# Patient Record
Sex: FEMALE | Race: BLACK OR AFRICAN AMERICAN | NOT HISPANIC OR LATINO | Employment: UNEMPLOYED | URBAN - METROPOLITAN AREA
[De-identification: names, ages, dates, MRNs, and addresses within clinical notes are randomized per-mention and may not be internally consistent; named-entity substitution may affect disease eponyms.]

---

## 2024-04-03 ENCOUNTER — TELEPHONE (OUTPATIENT)
Dept: GASTROENTEROLOGY | Facility: CLINIC | Age: 54
End: 2024-04-03
Payer: MEDICAID

## 2024-04-03 NOTE — TELEPHONE ENCOUNTER
Referral received from Dr. David Hager. Ok to schedule pt in 30 min slot.     MA attempted to contact pt to inform her she was placed on a wait list to see Dr. Cervantes, but patient did not answer. A text was was sent for Mrs. Bell to sign up for [facility] patient portal.

## 2024-09-05 ENCOUNTER — TELEPHONE (OUTPATIENT)
Dept: GASTROENTEROLOGY | Facility: CLINIC | Age: 54
End: 2024-09-05
Payer: MEDICAID

## 2024-10-25 ENCOUNTER — TELEPHONE (OUTPATIENT)
Dept: GASTROENTEROLOGY | Facility: CLINIC | Age: 54
End: 2024-10-25
Payer: MEDICAID

## 2024-10-25 NOTE — TELEPHONE ENCOUNTER
MA spoke with patient. Dr. Cervantes on 11/6 is being cancelled. Patient was offered and declined an appointment on 11/22. She is requesting to be scheduled for a morning appointment only. MA informed patient all the available appointments for Dr. Cervantes will be in the afternoon. Patient stated she will call back to reschedule her appointment.

## 2025-02-26 ENCOUNTER — TELEPHONE (OUTPATIENT)
Dept: GASTROENTEROLOGY | Facility: CLINIC | Age: 55
End: 2025-02-26
Payer: MEDICAID

## 2025-02-26 NOTE — TELEPHONE ENCOUNTER
MA attempted to contact patient to schedule an appointment with Dr. Cervantes, but she did not answer. A voicemail was left for patient to return a call to our office.

## 2025-02-26 NOTE — TELEPHONE ENCOUNTER
----- Message from Purvi Cervantes MD sent at 2/21/2025  5:08 PM CST -----  Regarding: RE: request to reschedule her appt with Dr. Cervantes  Sure, May sounds good  ----- Message -----  From: Haily Conti MA  Sent: 2/21/2025   4:59 PM CST  To: Purvi Cervantes MD  Subject: FW: request to reschedule her appt with  #    Can I offer her a date in May?  ----- Message -----  From: Lalo Trujillo  Sent: 2/21/2025   4:19 PM CST  To: [facility] Staff  Subject: request to reschedule her appt with Dr. Cervantes    Type: Patient Call BackWho called: Katie What is the request in detail: The patient is requesting a call back to reschedule her cancelled appt with Dr. Cervantes that was on 11/6. Please return call at earliest convenience.Can the clinic reply by [facility]? No Would the patient rather a call back or a response via [facility]? Call [facility] call back number: [phone]

## 2025-02-27 ENCOUNTER — TELEPHONE (OUTPATIENT)
Dept: GASTROENTEROLOGY | Facility: CLINIC | Age: 55
End: 2025-02-27
Payer: MEDICAID

## 2025-03-03 ENCOUNTER — TELEPHONE (OUTPATIENT)
Dept: GASTROENTEROLOGY | Facility: CLINIC | Age: 55
End: 2025-03-03
Payer: MEDICAID